# Patient Record
Sex: MALE | Race: BLACK OR AFRICAN AMERICAN | NOT HISPANIC OR LATINO | Employment: UNEMPLOYED | URBAN - METROPOLITAN AREA
[De-identification: names, ages, dates, MRNs, and addresses within clinical notes are randomized per-mention and may not be internally consistent; named-entity substitution may affect disease eponyms.]

---

## 2023-12-08 ENCOUNTER — HOSPITAL ENCOUNTER (EMERGENCY)
Facility: HOSPITAL | Age: 58
Discharge: OTHER INSTITUTION | End: 2023-12-09
Attending: STUDENT IN AN ORGANIZED HEALTH CARE EDUCATION/TRAINING PROGRAM
Payer: COMMERCIAL

## 2023-12-08 DIAGNOSIS — R45.851 SUICIDAL IDEATION: Primary | ICD-10-CM

## 2023-12-08 DIAGNOSIS — R44.3 HALLUCINATIONS: ICD-10-CM

## 2023-12-08 PROBLEM — F20.0 PARANOID SCHIZOPHRENIA (MULTI): Status: ACTIVE | Noted: 2023-12-08

## 2023-12-08 LAB
ALBUMIN SERPL-MCNC: 4.5 G/DL (ref 3.5–5)
ALP BLD-CCNC: 99 U/L (ref 35–125)
ALT SERPL-CCNC: 19 U/L (ref 5–40)
AMPHETAMINES UR QL SCN>1000 NG/ML: NEGATIVE
ANION GAP SERPL CALC-SCNC: 12 MMOL/L
APPEARANCE UR: CLEAR
AST SERPL-CCNC: 23 U/L (ref 5–40)
BARBITURATES UR QL SCN>300 NG/ML: NEGATIVE
BASOPHILS # BLD AUTO: 0.03 X10*3/UL (ref 0–0.1)
BASOPHILS NFR BLD AUTO: 0.3 %
BENZODIAZ UR QL SCN>300 NG/ML: NEGATIVE
BILIRUB SERPL-MCNC: 0.3 MG/DL (ref 0.1–1.2)
BILIRUB UR STRIP.AUTO-MCNC: NEGATIVE MG/DL
BUN SERPL-MCNC: 10 MG/DL (ref 8–25)
BZE UR QL SCN>300 NG/ML: POSITIVE
CALCIUM SERPL-MCNC: 9.7 MG/DL (ref 8.5–10.4)
CANNABINOIDS UR QL SCN>50 NG/ML: POSITIVE
CHLORIDE SERPL-SCNC: 101 MMOL/L (ref 97–107)
CO2 SERPL-SCNC: 26 MMOL/L (ref 24–31)
COLOR UR: YELLOW
CREAT SERPL-MCNC: 0.9 MG/DL (ref 0.4–1.6)
EOSINOPHIL # BLD AUTO: 0.05 X10*3/UL (ref 0–0.7)
EOSINOPHIL NFR BLD AUTO: 0.5 %
ERYTHROCYTE [DISTWIDTH] IN BLOOD BY AUTOMATED COUNT: 13.6 % (ref 11.5–14.5)
ETHANOL SERPL-MCNC: <0.01 G/DL
FENTANYL+NORFENTANYL UR QL SCN: NEGATIVE
GFR SERPL CREATININE-BSD FRML MDRD: >90 ML/MIN/1.73M*2
GLUCOSE SERPL-MCNC: 100 MG/DL (ref 65–99)
GLUCOSE UR STRIP.AUTO-MCNC: NORMAL MG/DL
HCT VFR BLD AUTO: 45.2 % (ref 41–52)
HGB BLD-MCNC: 15 G/DL (ref 13.5–17.5)
HOLD SPECIMEN: NORMAL
IMM GRANULOCYTES # BLD AUTO: 0.02 X10*3/UL (ref 0–0.7)
IMM GRANULOCYTES NFR BLD AUTO: 0.2 % (ref 0–0.9)
KETONES UR STRIP.AUTO-MCNC: ABNORMAL MG/DL
LEUKOCYTE ESTERASE UR QL STRIP.AUTO: NEGATIVE
LYMPHOCYTES # BLD AUTO: 3.11 X10*3/UL (ref 1.2–4.8)
LYMPHOCYTES NFR BLD AUTO: 32.7 %
MCH RBC QN AUTO: 29.5 PG (ref 26–34)
MCHC RBC AUTO-ENTMCNC: 33.2 G/DL (ref 32–36)
MCV RBC AUTO: 89 FL (ref 80–100)
METHADONE UR QL SCN>300 NG/ML: NEGATIVE
MONOCYTES # BLD AUTO: 0.65 X10*3/UL (ref 0.1–1)
MONOCYTES NFR BLD AUTO: 6.8 %
MUCOUS THREADS #/AREA URNS AUTO: NORMAL /LPF
NEUTROPHILS # BLD AUTO: 5.64 X10*3/UL (ref 1.2–7.7)
NEUTROPHILS NFR BLD AUTO: 59.5 %
NITRITE UR QL STRIP.AUTO: NEGATIVE
NRBC BLD-RTO: 0 /100 WBCS (ref 0–0)
OPIATES UR QL SCN>300 NG/ML: NEGATIVE
OXYCODONE UR QL: NEGATIVE
PCP UR QL SCN>25 NG/ML: NEGATIVE
PH UR STRIP.AUTO: 5.5 [PH]
PLATELET # BLD AUTO: 291 X10*3/UL (ref 150–450)
POTASSIUM SERPL-SCNC: 3.5 MMOL/L (ref 3.4–5.1)
PROT SERPL-MCNC: 7.9 G/DL (ref 5.9–7.9)
PROT UR STRIP.AUTO-MCNC: ABNORMAL MG/DL
RBC # BLD AUTO: 5.08 X10*6/UL (ref 4.5–5.9)
RBC # UR STRIP.AUTO: NEGATIVE /UL
RBC #/AREA URNS AUTO: NORMAL /HPF
SARS-COV-2 RNA RESP QL NAA+PROBE: NOT DETECTED
SODIUM SERPL-SCNC: 139 MMOL/L (ref 133–145)
SP GR UR STRIP.AUTO: 1.03
UROBILINOGEN UR STRIP.AUTO-MCNC: ABNORMAL MG/DL
WBC # BLD AUTO: 9.5 X10*3/UL (ref 4.4–11.3)
WBC #/AREA URNS AUTO: NORMAL /HPF

## 2023-12-08 PROCEDURE — 90839 PSYTX CRISIS INITIAL 60 MIN: CPT

## 2023-12-08 PROCEDURE — 99285 EMERGENCY DEPT VISIT HI MDM: CPT | Mod: 25 | Performed by: STUDENT IN AN ORGANIZED HEALTH CARE EDUCATION/TRAINING PROGRAM

## 2023-12-08 PROCEDURE — 80307 DRUG TEST PRSMV CHEM ANLYZR: CPT | Performed by: STUDENT IN AN ORGANIZED HEALTH CARE EDUCATION/TRAINING PROGRAM

## 2023-12-08 PROCEDURE — 36415 COLL VENOUS BLD VENIPUNCTURE: CPT | Performed by: STUDENT IN AN ORGANIZED HEALTH CARE EDUCATION/TRAINING PROGRAM

## 2023-12-08 PROCEDURE — 82077 ASSAY SPEC XCP UR&BREATH IA: CPT | Performed by: STUDENT IN AN ORGANIZED HEALTH CARE EDUCATION/TRAINING PROGRAM

## 2023-12-08 PROCEDURE — 85025 COMPLETE CBC W/AUTO DIFF WBC: CPT | Performed by: STUDENT IN AN ORGANIZED HEALTH CARE EDUCATION/TRAINING PROGRAM

## 2023-12-08 PROCEDURE — 87635 SARS-COV-2 COVID-19 AMP PRB: CPT | Performed by: STUDENT IN AN ORGANIZED HEALTH CARE EDUCATION/TRAINING PROGRAM

## 2023-12-08 PROCEDURE — 80053 COMPREHEN METABOLIC PANEL: CPT | Performed by: STUDENT IN AN ORGANIZED HEALTH CARE EDUCATION/TRAINING PROGRAM

## 2023-12-08 PROCEDURE — 81001 URINALYSIS AUTO W/SCOPE: CPT | Performed by: STUDENT IN AN ORGANIZED HEALTH CARE EDUCATION/TRAINING PROGRAM

## 2023-12-08 RX ORDER — HYDROXYZINE HYDROCHLORIDE 25 MG/1
25 TABLET, FILM COATED ORAL ONCE
Status: DISCONTINUED | OUTPATIENT
Start: 2023-12-08 | End: 2023-12-09 | Stop reason: HOSPADM

## 2023-12-08 SDOH — HEALTH STABILITY: MENTAL HEALTH: NON-SPECIFIC ACTIVE SUICIDAL THOUGHTS (PAST 1 MONTH): YES

## 2023-12-08 SDOH — HEALTH STABILITY: MENTAL HEALTH: ACTIVE SUICIDAL IDEATION WITH SOME INTENT TO ACT, WITHOUT SPECIFIC PLAN (PAST 1 MONTH): NO

## 2023-12-08 SDOH — HEALTH STABILITY: MENTAL HEALTH: ARE YOU HAVING THOUGHTS OF KILLING YOURSELF RIGHT NOW?: YES

## 2023-12-08 SDOH — HEALTH STABILITY: MENTAL HEALTH: ACTIVE SUICIDAL IDEATION WITH SPECIFIC PLAN AND INTENT (PAST 1 MONTH): YES

## 2023-12-08 SDOH — ECONOMIC STABILITY: GENERAL

## 2023-12-08 SDOH — HEALTH STABILITY: MENTAL HEALTH: DESCRIBE YOUR THOUGHTS OF KILLING YOURSELF RIGHT NOW:: PT REPORTED HE HAS NO PLAN, JUST WANTS TO DIE

## 2023-12-08 SDOH — HEALTH STABILITY: MENTAL HEALTH: IN THE PAST WEEK, HAVE YOU BEEN HAVING THOUGHTS ABOUT KILLING YOURSELF?: YES

## 2023-12-08 SDOH — HEALTH STABILITY: MENTAL HEALTH: ANXIETY SYMPTOMS: NO PROBLEMS REPORTED OR OBSERVED.

## 2023-12-08 SDOH — HEALTH STABILITY: MENTAL HEALTH: HAVE YOU EVER TRIED TO KILL YOURSELF?: NO

## 2023-12-08 SDOH — HEALTH STABILITY: MENTAL HEALTH: DEPRESSION SYMPTOMS: NO PROBLEMS REPORTED OR OBSERVED.

## 2023-12-08 SDOH — HEALTH STABILITY: MENTAL HEALTH: WISH TO BE DEAD (PAST 1 MONTH): NO

## 2023-12-08 SDOH — ECONOMIC STABILITY: HOUSING INSECURITY: FEELS SAFE LIVING IN HOME: YES

## 2023-12-08 SDOH — HEALTH STABILITY: MENTAL HEALTH: SUICIDAL BEHAVIOR (3 MONTHS): YES

## 2023-12-08 SDOH — HEALTH STABILITY: MENTAL HEALTH: SUICIDAL BEHAVIOR (LIFETIME): YES

## 2023-12-08 SDOH — HEALTH STABILITY: MENTAL HEALTH: IN THE PAST FEW WEEKS, HAVE YOU WISHED YOU WERE DEAD?: YES

## 2023-12-08 SDOH — HEALTH STABILITY: MENTAL HEALTH: IN THE PAST FEW WEEKS, HAVE YOU FELT THAT YOU OR YOUR FAMILY WOULD BE BETTER OFF IF YOU WERE DEAD?: YES

## 2023-12-08 ASSESSMENT — PAIN - FUNCTIONAL ASSESSMENT: PAIN_FUNCTIONAL_ASSESSMENT: 0-10

## 2023-12-08 ASSESSMENT — PAIN SCALES - GENERAL: PAINLEVEL_OUTOF10: 0 - NO PAIN

## 2023-12-08 ASSESSMENT — COLUMBIA-SUICIDE SEVERITY RATING SCALE - C-SSRS
6. HAVE YOU EVER DONE ANYTHING, STARTED TO DO ANYTHING, OR PREPARED TO DO ANYTHING TO END YOUR LIFE?: YES
6. HAVE YOU EVER DONE ANYTHING, STARTED TO DO ANYTHING, OR PREPARED TO DO ANYTHING TO END YOUR LIFE?: YES
1. IN THE PAST MONTH, HAVE YOU WISHED YOU WERE DEAD OR WISHED YOU COULD GO TO SLEEP AND NOT WAKE UP?: YES
4. HAVE YOU HAD THESE THOUGHTS AND HAD SOME INTENTION OF ACTING ON THEM?: YES
5. HAVE YOU STARTED TO WORK OUT OR WORKED OUT THE DETAILS OF HOW TO KILL YOURSELF? DO YOU INTEND TO CARRY OUT THIS PLAN?: YES
2. HAVE YOU ACTUALLY HAD ANY THOUGHTS OF KILLING YOURSELF?: YES

## 2023-12-08 ASSESSMENT — LIFESTYLE VARIABLES
EVER FELT BAD OR GUILTY ABOUT YOUR DRINKING: NO
HAVE PEOPLE ANNOYED YOU BY CRITICIZING YOUR DRINKING: NO
PRESCIPTION_ABUSE_PAST_12_MONTHS: NO
EVER HAD A DRINK FIRST THING IN THE MORNING TO STEADY YOUR NERVES TO GET RID OF A HANGOVER: NO
HAVE YOU EVER FELT YOU SHOULD CUT DOWN ON YOUR DRINKING: NO
SUBSTANCE_ABUSE_PAST_12_MONTHS: YES
REASON UNABLE TO ASSESS: NO

## 2023-12-09 VITALS
DIASTOLIC BLOOD PRESSURE: 88 MMHG | OXYGEN SATURATION: 98 % | RESPIRATION RATE: 18 BRPM | HEART RATE: 82 BPM | WEIGHT: 176.59 LBS | HEIGHT: 66 IN | TEMPERATURE: 98.4 F | SYSTOLIC BLOOD PRESSURE: 138 MMHG | BODY MASS INDEX: 28.38 KG/M2

## 2023-12-09 NOTE — PROGRESS NOTES
DARIAN did a M&G with pt. Pt stated he just wanted to kill his girlfriend (13 years) and the gm she was cheating on him with having relations. SW asked pt if he drank this evening. Pt reported 1 beer. Pt has submitted blood and covid sample, awaiting in UA and EKG. Pt has been pink slipped by SW and pt has been advised it has been done for safety at this time. Pt is aware and understand. Pt requested something for his anxiety and so he can rest and sleep as he has very little sleep this past week.

## 2023-12-09 NOTE — PROGRESS NOTES
TCR from [facility] @ [facility]. Pt has been accepted to [facility] under Dr. Gimenez to the 1600 unit (2550D) [phone]

## 2023-12-09 NOTE — PROGRESS NOTES
58-year-old male initially evaluated by Dr. Deng and medically cleared for transfer to a psychiatric institution to manage his suicidal ideation and delusions.  He has been calm and cooperative overnight and has not required any interventions or de-escalation's.  He was excepted at [facility] by Dr. Gimenez and was transferred without incident

## 2023-12-09 NOTE — ED NOTES
Pt states that he is diagnosed with paranoid schizophrenia but has not taken his meds in a while. States his jaw was recently broken and had surgery on it and would like to speak with the doctor about what is going on. PA and  currently at bedside     Caprice Zuniga LPN  12/08/23 2540

## 2023-12-09 NOTE — ED PROVIDER NOTES
"HPI   Chief Complaint   Patient presents with   • Psychiatric Evaluation     PT reports having suicidal and homicidal intentions, pt reports wanting to harm his wife and best friend. Pt endorses feeling of wanting to harm himself. Pt reports detoxing from Crack Cocaine and alcohol.        HPI     Pt is a 59 yo male w/  Hx paranoid  schizophrenia presenting for  evaluation of SI/ HI   Reports having  found his  wife in bed with another man  today and thinking about harming them and  himself.   Does admit  to  EtOH/ crack use today  Pt does admit to non compliance w/ his medications because \"they tricked me into stopping them\"   Does report command hallucinations telling him to kill his wife and best friend  Denies any CP / SOB / F /cough                No data recorded                Patient History   Past Medical History:   Diagnosis Date   • Paranoid schizophrenia (CMS/HCC)      Past Surgical History:   Procedure Laterality Date   • CT ANGIO NECK  11/1/2018    CT NECK ANGIO W AND WO IV [place] [facility] [place]   • CT HEAD ANGIO W AND WO IV CONTRAST  12/15/2021    CT HEAD ANGIO W AND WO IV CONTRAST [place]2021 [facility] [place]   • CT HEAD ANGIO W AND WO IV CONTRAST  11/1/2018    CT HEAD ANGIO W AND WO IV CONTRAST [place]/2018 [facility] [place]     No family history on file.  Social History     Tobacco Use   • Smoking status: Every Day     Packs/day: 2     Types: Cigarettes   • Smokeless tobacco: Not on file   Substance Use Topics   • Alcohol use: Yes   • Drug use: Yes     Types: \"Crack\" cocaine     Comment: daily use       Physical Exam   ED Triage Vitals [12/08/23 2031]   Temp Heart Rate Resp BP   36.6 °C (97.9 °F) 94 18 (!) 140/110      SpO2 Temp Source Heart Rate Source Patient Position   99 % Temporal -- Sitting      BP Location FiO2 (%)     Left arm --       Physical Exam  Vitals and nursing note reviewed.   Constitutional:       General: He is not in acute distress.     Appearance: He is " well-developed.   HENT:      Head: Normocephalic and atraumatic.   Eyes:      Conjunctiva/sclera: Conjunctivae normal.   Cardiovascular:      Rate and Rhythm: Normal rate and regular rhythm.      Heart sounds: No murmur heard.  Pulmonary:      Effort: Pulmonary effort is normal. No respiratory distress.      Breath sounds: Normal breath sounds.   Abdominal:      Palpations: Abdomen is soft.      Tenderness: There is no abdominal tenderness.   Musculoskeletal:         General: No swelling.      Cervical back: Neck supple.   Skin:     General: Skin is warm and dry.      Capillary Refill: Capillary refill takes less than 2 seconds.   Neurological:      General: No focal deficit present.      Mental Status: He is alert and oriented to person, place, and time.         ED Course & MDM   ED Course as of 12/09/23 1346   Sat Dec 09, 2023   0443 Urinalysis with Reflex Microscopic and Culture(!) [EG]   0443 Drug Screen, Urine(!) [EG]   0446 Urinalysis Microscopic [EG]   0446 Comprehensive Metabolic Panel(!) [EG]   0447 SARS-CoV-2 RT PCR [EG]   0447 Ethanol [EG]   0447 CBC with Differential [EG]   0447 Urine drug screen is positive for cannabis and cocaine and otherwise the toxicology panel is negative.  CBC within normal limits, CMP with normal limits.  No evidence of acute kidney injury. [EG]   0447 Vital signs within normal limits at time of transfer [EG]      ED Course User Index  [EG] Chastity Josue MD         Diagnoses as of 12/09/23 1346   Suicidal ideation   Hallucinations       Medical Decision Making  Pt presents for eval SI/HI   VSS   Pt saturating 99% on RA   Labs reassuring   UDS + cocaine / cannabis   EKG non ischemic.   Pt medically cleared for psych Txfer     Procedure  Procedures     Lopez Deng MD  12/09/23 1346

## 2023-12-09 NOTE — PROGRESS NOTES
TCR from [facility]. Pt has been accepted to [facility] under Dr. Gimenez to the 1600 unit (0274A) [phone]

## 2023-12-09 NOTE — ED PROVIDER NOTES
HPI   Chief Complaint   Patient presents with    Psychiatric Evaluation     PT reports having suicidal and homicidal intentions, pt reports wanting to harm his wife and best friend. Pt endorses feeling of wanting to harm himself. Pt reports detoxing from Crack Cocaine and alcohol.        Patient is a 58-year-old male presents emergency department for evaluation of suicidal and homicidal ideation.  Patient states that he lives in an apartment and 1 week ago discovered his girlfriend sleeping with one of his best friends.  He became very irate and agitated.  Over the last week he has not been sleeping well.  He has been having auditory hallucinations and hearing voices telling him to kill these other people involved.  He states that he was told to go kick down their door and beat them up.  He noted that if he cannot kill them he was going to kill himself.  He is very distraught about the voices.  He notes a history of paranoid schizophrenia and states that he supposed to be on medications, but has not been taking them as he does not have access to them.  He is was to follow with [facility], but has not seen them.  He does admit to drug use of cocaine with his last use last night and occasional alcohol use.  He has no medical complaints at this time.  He has passive suicidal ideation and passive homicidal ideation with no active plans at this time.      History provided by:  Patient   used: No                        No data recorded                Patient History   Past Medical History:   Diagnosis Date    Paranoid schizophrenia (CMS/[facility])      Past Surgical History:   Procedure Laterality Date    CT ANGIO NECK  11/1/2018    CT NECK ANGIO W AND WO IV CONTRAST [place] [facility] [place]    CT HEAD ANGIO W AND WO IV CONTRAST  12/15/2021    CT HEAD ANGIO W AND WO IV CONTRAST 12[place]15/2021 [facility] [place] [facility]    CT HEAD ANGIO W AND WO IV CONTRAST  11/1/2018    CT HEAD ANGIO W AND WO IV CONTRAST  "11/1/2018 [facility] [place] [facility]     No family history on file.  Social History     Tobacco Use    Smoking status: Every Day     Packs/day: 2     Types: Cigarettes    Smokeless tobacco: Not on file   Substance Use Topics    Alcohol use: Yes    Drug use: Yes     Types: \"Crack\" cocaine     Comment: daily use       Physical Exam   ED Triage Vitals [12/08/23 2031]   Temp Heart Rate Resp BP   36.6 °C (97.9 °F) 94 18 (!) 140/110      SpO2 Temp Source Heart Rate Source Patient Position   99 % Temporal -- Sitting      BP Location FiO2 (%)     Left arm --       Physical Exam  Constitutional:       Appearance: Normal appearance.   HENT:      Head: Normocephalic and atraumatic.   Eyes:      Extraocular Movements: Extraocular movements intact.      Pupils: Pupils are equal, round, and reactive to light.   Cardiovascular:      Rate and Rhythm: Normal rate and regular rhythm.   Pulmonary:      Effort: Pulmonary effort is normal.      Breath sounds: Normal breath sounds.   Musculoskeletal:         General: Normal range of motion.   Skin:     General: Skin is warm and dry.   Neurological:      General: No focal deficit present.      Mental Status: He is alert and oriented to person, place, and time.   Psychiatric:         Attention and Perception: He perceives auditory hallucinations.         Mood and Affect: Mood is depressed.         Thought Content: Thought content includes homicidal and suicidal ideation.      Comments: Internally stimulated, good insight, very disorganized thought content, restless.         ED Course & MDM   Diagnoses as of 12/09/23 0157   Suicidal ideation   Hallucinations       Medical Decision Making  Patient is a 58-year-old male presents emergency department for evaluation of suicidal and homicidal ideation with auditory hallucinations.    EKG was interpreted by attending physician.    Lab work done today included CMP, CBC, ethanol, urinalysis, urine drug screen, and COVID swabs.  Lab work with urine drug " screen positive for cannabis and cocaine otherwise unremarkable.    Scans not warranted at today's visit.      Medications given at today's visit include PO hydroxyzine    I saw this patient in conjunction with Dr. Deng.  Patient is both suicidal and homicidal and distressed over his auditory hallucinations.  He has not been sleeping well and has been off his psychiatric medications.  He was evaluated as well by crisis counselor and they agreed with pink slip for patient to go inpatient psychiatric care.  Patient medically clear at this time and pending inpatient placement.  Patient final disposition was ongoing at time of my departure. Continuation of care and final disposition for placement for inpatient care will by managed by attending physician. We discussed the pertinent history, physical exam, completed/pending test results (if applicable) and current treatment plan. Please refer to his/her chart for the patients remaining Emergency Department course and final disposition.    ** Disclaimer:  Parts of this document were written utilizing a voice to text dictation software.  Note may contain minor transcription or typographical errors that were inadvertently transcribed by the computer software.        Procedure  Procedures     Chinyere Huber PA-C  12/09/23 0157

## 2023-12-11 ENCOUNTER — HOSPITAL ENCOUNTER (OUTPATIENT)
Dept: CARDIOLOGY | Facility: HOSPITAL | Age: 58
Discharge: HOME | End: 2023-12-11
Payer: COMMERCIAL

## 2023-12-11 LAB
ATRIAL RATE: 69 BPM
P AXIS: 60 DEGREES
P OFFSET: 180 MS
P ONSET: 125 MS
PR INTERVAL: 192 MS
Q ONSET: 221 MS
QRS COUNT: 11 BEATS
QRS DURATION: 88 MS
QT INTERVAL: 382 MS
QTC CALCULATION(BAZETT): 409 MS
QTC FREDERICIA: 400 MS
R AXIS: -4 DEGREES
T AXIS: 39 DEGREES
T OFFSET: 412 MS
VENTRICULAR RATE: 69 BPM

## 2023-12-11 PROCEDURE — 93005 ELECTROCARDIOGRAM TRACING: CPT
